# Patient Record
Sex: MALE | Race: WHITE | NOT HISPANIC OR LATINO | Employment: OTHER | URBAN - METROPOLITAN AREA
[De-identification: names, ages, dates, MRNs, and addresses within clinical notes are randomized per-mention and may not be internally consistent; named-entity substitution may affect disease eponyms.]

---

## 2017-11-19 PROBLEM — N18.4 CKD (CHRONIC KIDNEY DISEASE), STAGE IV: Status: ACTIVE | Noted: 2017-11-19

## 2017-11-19 PROBLEM — E11.9 DIABETES: Status: ACTIVE | Noted: 2017-11-19

## 2017-11-19 PROBLEM — I50.9 CONGESTIVE HEART FAILURE: Status: ACTIVE | Noted: 2017-11-19

## 2017-11-19 PROBLEM — I25.10 ATHEROSCLEROSIS OF NATIVE CORONARY ARTERY: Status: ACTIVE | Noted: 2017-11-19

## 2017-12-03 PROBLEM — I25.10 ATHEROSCLEROSIS OF NATIVE CORONARY ARTERY: Status: ACTIVE | Noted: 2017-12-03

## 2017-12-03 PROBLEM — N18.4 CKD (CHRONIC KIDNEY DISEASE), STAGE IV: Status: ACTIVE | Noted: 2017-12-03

## 2017-12-03 PROBLEM — E11.9 DIABETES: Status: ACTIVE | Noted: 2017-12-03

## 2018-02-01 PROBLEM — I25.10 CORONARY ARTERY DISEASE: Status: ACTIVE | Noted: 2018-02-01

## 2018-02-01 PROBLEM — I73.9 PAD (PERIPHERAL ARTERY DISEASE): Status: ACTIVE | Noted: 2018-02-01

## 2018-02-10 PROBLEM — I50.1 PULMONARY EDEMA CARDIAC CAUSE: Status: ACTIVE | Noted: 2018-02-10

## 2018-12-11 ENCOUNTER — TELEPHONE (OUTPATIENT)
Dept: TRANSPLANT | Facility: CLINIC | Age: 63
End: 2018-12-11

## 2018-12-17 DIAGNOSIS — Z76.82 ORGAN TRANSPLANT CANDIDATE: Primary | ICD-10-CM

## 2019-02-21 ENCOUNTER — HOSPITAL ENCOUNTER (OUTPATIENT)
Dept: RADIOLOGY | Facility: HOSPITAL | Age: 64
Discharge: HOME | End: 2019-02-21
Attending: NURSE PRACTITIONER
Payer: MEDICARE

## 2019-02-21 ENCOUNTER — OFFICE VISIT (OUTPATIENT)
Dept: CARDIOLOGY | Facility: CLINIC | Age: 64
End: 2019-02-21
Payer: MEDICARE

## 2019-02-21 ENCOUNTER — TELEPHONE (OUTPATIENT)
Dept: TRANSPLANT | Facility: CLINIC | Age: 64
End: 2019-02-21

## 2019-02-21 ENCOUNTER — OFFICE VISIT (OUTPATIENT)
Dept: TRANSPLANT | Facility: CLINIC | Age: 64
End: 2019-02-21
Payer: MEDICARE

## 2019-02-21 VITALS
WEIGHT: 194.25 LBS | BODY MASS INDEX: 27.81 KG/M2 | SYSTOLIC BLOOD PRESSURE: 125 MMHG | DIASTOLIC BLOOD PRESSURE: 58 MMHG | HEIGHT: 70 IN | HEART RATE: 65 BPM

## 2019-02-21 VITALS
RESPIRATION RATE: 16 BRPM | DIASTOLIC BLOOD PRESSURE: 57 MMHG | HEIGHT: 68 IN | SYSTOLIC BLOOD PRESSURE: 132 MMHG | HEART RATE: 65 BPM | BODY MASS INDEX: 29.01 KG/M2 | TEMPERATURE: 98 F | OXYGEN SATURATION: 97 % | WEIGHT: 191.38 LBS

## 2019-02-21 DIAGNOSIS — Z99.2 CONTROLLED TYPE 2 DIABETES MELLITUS WITH CHRONIC KIDNEY DISEASE ON CHRONIC DIALYSIS, UNSPECIFIED WHETHER LONG TERM INSULIN USE: ICD-10-CM

## 2019-02-21 DIAGNOSIS — N18.6 ANEMIA IN ESRD (END-STAGE RENAL DISEASE): ICD-10-CM

## 2019-02-21 DIAGNOSIS — N18.6 CONTROLLED TYPE 2 DIABETES MELLITUS WITH CHRONIC KIDNEY DISEASE ON CHRONIC DIALYSIS, UNSPECIFIED WHETHER LONG TERM INSULIN USE: ICD-10-CM

## 2019-02-21 DIAGNOSIS — E11.22 CONTROLLED TYPE 2 DIABETES MELLITUS WITH CHRONIC KIDNEY DISEASE ON CHRONIC DIALYSIS, UNSPECIFIED WHETHER LONG TERM INSULIN USE: ICD-10-CM

## 2019-02-21 DIAGNOSIS — Z95.1 S/P CABG X 4: Chronic | ICD-10-CM

## 2019-02-21 DIAGNOSIS — I10 ESSENTIAL HYPERTENSION: Chronic | ICD-10-CM

## 2019-02-21 DIAGNOSIS — I73.9 PAD (PERIPHERAL ARTERY DISEASE): ICD-10-CM

## 2019-02-21 DIAGNOSIS — N18.6 ESRD (END STAGE RENAL DISEASE) ON DIALYSIS: Chronic | ICD-10-CM

## 2019-02-21 DIAGNOSIS — E78.00 HYPERCHOLESTEREMIA: Chronic | ICD-10-CM

## 2019-02-21 DIAGNOSIS — Z99.2 ESRD (END STAGE RENAL DISEASE) ON DIALYSIS: Chronic | ICD-10-CM

## 2019-02-21 DIAGNOSIS — R80.9 PROTEINURIA, UNSPECIFIED TYPE: Chronic | ICD-10-CM

## 2019-02-21 DIAGNOSIS — Z79.01 CURRENT USE OF LONG TERM ANTICOAGULATION: Chronic | ICD-10-CM

## 2019-02-21 DIAGNOSIS — Z01.810 PREOPERATIVE CARDIOVASCULAR EXAMINATION: Primary | ICD-10-CM

## 2019-02-21 DIAGNOSIS — Z01.818 PRE-TRANSPLANT EVALUATION FOR CHRONIC KIDNEY DISEASE: ICD-10-CM

## 2019-02-21 DIAGNOSIS — Z76.82 ORGAN TRANSPLANT CANDIDATE: ICD-10-CM

## 2019-02-21 DIAGNOSIS — D63.1 ANEMIA IN ESRD (END-STAGE RENAL DISEASE): ICD-10-CM

## 2019-02-21 DIAGNOSIS — I25.10 CORONARY ARTERY DISEASE, ANGINA PRESENCE UNSPECIFIED, UNSPECIFIED VESSEL OR LESION TYPE, UNSPECIFIED WHETHER NATIVE OR TRANSPLANTED HEART: Primary | ICD-10-CM

## 2019-02-21 DIAGNOSIS — I25.10 CORONARY ARTERY DISEASE INVOLVING NATIVE CORONARY ARTERY OF NATIVE HEART WITHOUT ANGINA PECTORIS: ICD-10-CM

## 2019-02-21 DIAGNOSIS — E78.49 OTHER HYPERLIPIDEMIA: ICD-10-CM

## 2019-02-21 PROCEDURE — 93978 VASCULAR STUDY: CPT | Mod: TC,TXP

## 2019-02-21 PROCEDURE — 99204 PR OFFICE/OUTPT VISIT, NEW, LEVL IV, 45-59 MIN: ICD-10-PCS | Mod: S$PBB,TXP,, | Performed by: PHYSICIAN ASSISTANT

## 2019-02-21 PROCEDURE — 99205 OFFICE O/P NEW HI 60 MIN: CPT | Mod: S$PBB,TXP,, | Performed by: NURSE PRACTITIONER

## 2019-02-21 PROCEDURE — 71046 XR CHEST PA AND LATERAL: ICD-10-PCS | Mod: 26,TXP,, | Performed by: RADIOLOGY

## 2019-02-21 PROCEDURE — 93010 ELECTROCARDIOGRAM REPORT: CPT | Mod: S$PBB,TXP,, | Performed by: INTERNAL MEDICINE

## 2019-02-21 PROCEDURE — 99204 OFFICE O/P NEW MOD 45 MIN: CPT | Mod: S$PBB,TXP,, | Performed by: INTERNAL MEDICINE

## 2019-02-21 PROCEDURE — 76770 US EXAM ABDO BACK WALL COMP: CPT | Mod: TC,TXP

## 2019-02-21 PROCEDURE — 72170 X-RAY EXAM OF PELVIS: CPT | Mod: TC,FY,TXP

## 2019-02-21 PROCEDURE — 99999 PR PBB SHADOW E&M-EST. PATIENT-LVL IV: ICD-10-PCS | Mod: PBBFAC,TXP,, | Performed by: NURSE PRACTITIONER

## 2019-02-21 PROCEDURE — 93005 ELECTROCARDIOGRAM TRACING: CPT | Mod: PBBFAC,NTX | Performed by: INTERNAL MEDICINE

## 2019-02-21 PROCEDURE — 99204 OFFICE O/P NEW MOD 45 MIN: CPT | Mod: S$PBB,TXP,, | Performed by: PHYSICIAN ASSISTANT

## 2019-02-21 PROCEDURE — 99205 PR OFFICE/OUTPT VISIT, NEW, LEVL V, 60-74 MIN: ICD-10-PCS | Mod: S$PBB,TXP,, | Performed by: NURSE PRACTITIONER

## 2019-02-21 PROCEDURE — 99203 OFFICE O/P NEW LOW 30 MIN: CPT | Mod: S$PBB,TXP,, | Performed by: TRANSPLANT SURGERY

## 2019-02-21 PROCEDURE — 76770 US EXAM ABDO BACK WALL COMP: CPT | Mod: 26,TXP,, | Performed by: RADIOLOGY

## 2019-02-21 PROCEDURE — 99204 PR OFFICE/OUTPT VISIT, NEW, LEVL IV, 45-59 MIN: ICD-10-PCS | Mod: S$PBB,TXP,, | Performed by: INTERNAL MEDICINE

## 2019-02-21 PROCEDURE — 72170 XR PELVIS ROUTINE AP: ICD-10-PCS | Mod: 26,TXP,, | Performed by: RADIOLOGY

## 2019-02-21 PROCEDURE — 99214 OFFICE O/P EST MOD 30 MIN: CPT | Mod: PBBFAC,25,27,TXP | Performed by: NURSE PRACTITIONER

## 2019-02-21 PROCEDURE — 93010 EKG 12-LEAD: ICD-10-PCS | Mod: S$PBB,TXP,, | Performed by: INTERNAL MEDICINE

## 2019-02-21 PROCEDURE — 99999 PR PBB SHADOW E&M-EST. PATIENT-LVL III: ICD-10-PCS | Mod: PBBFAC,TXP,, | Performed by: INTERNAL MEDICINE

## 2019-02-21 PROCEDURE — 93978 US DOPP ILIACS BILATERAL: ICD-10-PCS | Mod: 26,TXP,, | Performed by: RADIOLOGY

## 2019-02-21 PROCEDURE — 93978 VASCULAR STUDY: CPT | Mod: 26,TXP,, | Performed by: RADIOLOGY

## 2019-02-21 PROCEDURE — 71046 X-RAY EXAM CHEST 2 VIEWS: CPT | Mod: TC,FY,TXP

## 2019-02-21 PROCEDURE — 99213 OFFICE O/P EST LOW 20 MIN: CPT | Mod: PBBFAC,TXP,25 | Performed by: INTERNAL MEDICINE

## 2019-02-21 PROCEDURE — 76770 US RETROPERITONEAL COMPLETE: ICD-10-PCS | Mod: 26,TXP,, | Performed by: RADIOLOGY

## 2019-02-21 PROCEDURE — 99999 PR PBB SHADOW E&M-EST. PATIENT-LVL IV: CPT | Mod: PBBFAC,TXP,, | Performed by: NURSE PRACTITIONER

## 2019-02-21 PROCEDURE — 72170 X-RAY EXAM OF PELVIS: CPT | Mod: 26,TXP,, | Performed by: RADIOLOGY

## 2019-02-21 PROCEDURE — 99999 PR PBB SHADOW E&M-EST. PATIENT-LVL III: CPT | Mod: PBBFAC,TXP,, | Performed by: INTERNAL MEDICINE

## 2019-02-21 PROCEDURE — 99203 PR OFFICE/OUTPT VISIT, NEW, LEVL III, 30-44 MIN: ICD-10-PCS | Mod: S$PBB,TXP,, | Performed by: TRANSPLANT SURGERY

## 2019-02-21 PROCEDURE — 71046 X-RAY EXAM CHEST 2 VIEWS: CPT | Mod: 26,TXP,, | Performed by: RADIOLOGY

## 2019-02-21 RX ORDER — INSULIN ASPART 100 [IU]/ML
INJECTION, SOLUTION INTRAVENOUS; SUBCUTANEOUS
COMMUNITY
End: 2020-01-01 | Stop reason: CLARIF

## 2019-02-21 RX ORDER — SEVELAMER CARBONATE 800 MG/1
800 TABLET, FILM COATED ORAL
COMMUNITY

## 2019-02-21 RX ORDER — AMIODARONE HYDROCHLORIDE 200 MG/1
200 TABLET ORAL 2 TIMES DAILY
COMMUNITY
End: 2020-01-01 | Stop reason: CLARIF

## 2019-02-21 RX ORDER — AMLODIPINE BESYLATE 5 MG/1
5 TABLET ORAL DAILY
COMMUNITY
End: 2020-01-01 | Stop reason: CLARIF

## 2019-02-21 RX ORDER — CLOPIDOGREL BISULFATE 75 MG/1
75 TABLET ORAL DAILY
COMMUNITY

## 2019-02-21 NOTE — TELEPHONE ENCOUNTER
Reviewed pt transplant labs.  Notified dialysis unit dietitian of the following abnormal labs via fax.   Ha1c 6.9  Glu 51

## 2019-02-21 NOTE — PROGRESS NOTES
Transplant Surgery  Kidney Transplant Recipient Evaluation    Referring Physician: Asif Meza  Current Nephrologist: Asif Meza    Subjective:     Reason for Visit: evaluate transplant candidacy    History of Present Illness: Asif Cortez is a 63 y.o. year old male undergoing transplant evaluation.    Dialysis History: Asif is on hemodialysis.      Transplant History: N/A    Etiology of Renal Disease: Diabetes Mellitus - Type II (based on medical records from referral).    Review of Systems   Constitutional: Negative.  Negative for activity change and fatigue.   Respiratory: Negative for chest tightness and shortness of breath.    Cardiovascular: Negative for chest pain and leg swelling.   All other systems reviewed and are negative.      Objective:     Physical Exam:  Constitutional:   Vitals reviewed: yes   Well-nourished and well-groomed: yes  Eyes:   Sclerae icteric: no   Extraocular movements intact: yes  GI:    Bowel sounds normal: yes   Tenderness: no    If yes, quadrant/location: not applicable   Palpable masses: no    If yes, quadrant/location: not applicable   Hepatosplenomegaly: no   Ascites: no   Hernia: no    If yes, type/location: not applicable   Surgical scars: yes    If yes, type/location: midline plus bilateral thigh (femoral) incision and left femoropopliteal incision    Resp:   Effort normal: yes   Breath sounds normal: yes    CV:   Regular rate and rhythm: yes   Heart sounds normal: yes   Femoral pulses normal: yes   Extremities edematous: no  Skin:   Rashes or lesions present: no    If yes, describe:not applicable   Jaundice:: no    Musculoskeletal:   Gait normal: yes   Strength normal: yes  Psych:   Oriented to person, place, and time: yes   Affect and mood normal: yes    Additional comments: not applicable    Counseling: We provided Asif Cortez with a group education session today.  We discussed kidney transplantation at length with him, including risks, potential  complications, and alternatives in the management of his renal failure.  The discussion included complications related to anesthesia, bleeding, infection, primary nonfunction, and ATN.  I discussed the typical postoperative course, length of hospitalization, the need for long-term immunosuppression, and the need for long-term routine follow-up.  I discussed living-donor and -donor transplantation and the relative advantages and disadvantages of each.  I also discussed average waiting times for both living donation and  donation.  I discussed national and center-specific survival rates.  I also mentioned the potential benefit of multicenter listing to candidates listed with centers within more than one organ procurement organization.  All questions were answered.    Final determination of transplant candidacy will be made once evaluation is complete and reviewed by the Kidney & Kidney/Pancreas Selection Committee.         Transplant Surgery - Candidacy   Assessment/Plan:   Asif Cortez has end stage renal disease (ESRD) on dialysis. I have concerns with his h/o PVD - New allograft must go in the aorto-femoral graft. Based on available information, Asif Cortez is a high complexity a high-risk kidney transplant candidate. He's on ASA - plavix    Needs abdomen-pelvis CT-scan     Beny Galvan MD

## 2019-02-21 NOTE — PROGRESS NOTES
"Subjective:   Patient ID:  Asif Cortez is a 63 y.o. male who presents for  Kidney Transplant Evaluation      HPI:   The patient  presents for risk stratification for kidney transplantation. Asif Cortez has known coronary artery disease having had CABG X 4 in 2011. 4 months ago the patient the patient had a stress test performed by his  Cardiologist and subsequently had a coronary angiography with medical management recommended. He was having no chest pain and currently has none ( records not available. Asif Cortez has peripheral vascular disease and underwent aortobifemoral 10 months ago . Since then , he has had no further claudication. He is " as active as I can be ". Asif Cortez denies chest shortness of breath, palpitations, presyncope , or syncope. Asif Cortez has hypertension with good control.Asif Cortez has dyslipidemia  on high intensity statin..  Review of Systems   Constitution: Negative for weakness, malaise/fatigue, weight gain and weight loss.   Eyes: Negative for blurred vision.   Cardiovascular: Negative for chest pain, claudication, cyanosis, dyspnea on exertion, irregular heartbeat, leg swelling, near-syncope, orthopnea, palpitations, paroxysmal nocturnal dyspnea and syncope.   Respiratory: Negative for cough, shortness of breath and wheezing.    Musculoskeletal: Positive for back pain. Negative for falls and myalgias.   Gastrointestinal: Positive for constipation. Negative for abdominal pain, heartburn, nausea and vomiting.   Genitourinary: Negative for nocturia.   Neurological: Positive for headaches. Negative for brief paralysis, dizziness, focal weakness, numbness and paresthesias.        Headaches post dialysis   Psychiatric/Behavioral: Negative for altered mental status.       Current Outpatient Medications   Medication Sig    amiodarone (PACERONE) 200 MG Tab Take 200 mg by mouth 2 (two) times daily.    amLODIPine (NORVASC) 5 MG tablet Take 5 mg by mouth once daily.    " "aspirin 325 MG tablet Take 325 mg by mouth once daily.    atorvastatin (LIPITOR) 80 MG tablet Take 80 mg by mouth every evening.    clopidogrel (PLAVIX) 75 mg tablet Take 75 mg by mouth once daily.    hydrocodone-acetaminophen 10-325mg (NORCO)  mg Tab Take 1 tablet by mouth every 8 (eight) hours as needed for Pain.    insulin aspart U-100 (NOVOLOG FLEXPEN U-100 INSULIN) 100 unit/mL InPn pen Inject into the skin 3 (three) times daily with meals. 5 units with breakfast and dinner, 10 units at lunch    insulin detemir (LEVEMIR FLEXTOUCH U-100 INSULN SUBQ) Inject 35 Units into the skin every evening.    multivit-minerals/FA/lycopene (MEN'S DAILY ORAL) Take 1 tablet by mouth once daily.    sevelamer carbonate (RENVELA) 800 mg Tab Take 800 mg by mouth 3 (three) times daily with meals.    tamsulosin (FLOMAX) 0.4 mg Cp24 Take 0.4 mg by mouth every evening.    cloNIDine (CATAPRES) 0.3 MG tablet Take 1 tablet (0.3 mg total) by mouth 3 (three) times daily.     No current facility-administered medications for this visit.      Objective:   Physical Exam   Constitutional: He is oriented to person, place, and time. He appears well-developed. No distress.   BP (!) 125/58 (BP Location: Right arm, Patient Position: Sitting, BP Method: Medium (Automatic))   Pulse 65   Ht 5' 10" (1.778 m)   Wt 88.1 kg (194 lb 3.6 oz)   BMI 27.87 kg/m²    HENT:   Head: Normocephalic.   Right Ear: External ear normal.   Left Ear: External ear normal.   Eyes: EOM are normal. Pupils are equal, round, and reactive to light. No scleral icterus.   Neck: Neck supple. No JVD present. No thyromegaly present.   Cardiovascular: Normal rate, regular rhythm and intact distal pulses. PMI is not displaced. Exam reveals no gallop and no friction rub.   Murmur heard.   Medium-pitched midsystolic murmur is present with a grade of 2/6 at the upper right sternal border, upper left sternal border and lower left sternal border.  Pulses:       Femoral " pulses are 2+ on the right side, and 2+ on the left side.       Dorsalis pedis pulses are 0 on the right side, and 2+ on the left side.        Posterior tibial pulses are 2+ on the right side, and 2+ on the left side.   Patent fistula left UE with transmission of the bruit to the left shoulder and neck   Pulmonary/Chest: Effort normal and breath sounds normal. No respiratory distress. He has no wheezes. He has no rales.   Median sternotomy scar.   Abdominal: Soft. He exhibits no distension. There is no hepatosplenomegaly. There is no tenderness.   Abdominal surgical scar   Musculoskeletal: He exhibits no edema or tenderness.   Gait normal   Neurological: He is alert and oriented to person, place, and time.   Skin: Skin is warm and dry. No rash noted.   Psychiatric: He has a normal mood and affect. His behavior is normal.       Lab Results   Component Value Date     02/21/2019    K 3.6 02/21/2019     02/21/2019    CO2 25 02/21/2019    BUN 60 (H) 02/21/2019    CREATININE 4.2 (H) 02/21/2019    GLU 51 (L) 02/21/2019    HGBA1C 6.9 (H) 02/21/2019    MG 2.2 02/10/2018    AST 24 02/21/2019    ALT 31 02/21/2019    ALBUMIN 3.7 02/21/2019    PROT 7.5 02/21/2019    BILITOT 0.4 02/21/2019    WBC 11.35 02/21/2019    HGB 12.0 (L) 02/21/2019    HCT 36.6 (L) 02/21/2019    HCT 25 (L) 02/10/2018    MCV 95 02/21/2019     02/21/2019    CHOL 134 02/21/2019    HDL 48 02/21/2019    LDLCALC 61.8 (L) 02/21/2019    TRIG 121 02/21/2019       Assessment:     1. Coronary artery disease involving native coronary artery of native heart without angina pectoris : stable   2. Preoperative cardiovascular examination    3. S/P CABG x 4    4. PAD (peripheral artery disease) : No further sxs   5. Essential hypertension : Good control.   6. Hypercholesteremia :  on high intensity statin   7. ESRD (end stage renal disease) on dialysis X 6 months   8. Controlled type 2 diabetes mellitus with chronic kidney disease on chronic dialysis,  unspecified whether long term insulin use        Plan:     Asif was seen today for kidney transplant evaluation.    Diagnoses and all orders for this visit:    Preoperative cardiovascular examination  Have requested records from his Cardiologist. Would suggest obtaining a risk assessment from His  Cardiologist. Revised cardiac risk is > 11% for significant perioperative CV events.  Coronary artery disease involving native coronary artery of native heart without angina pectoris  -     EKG 12-lead; Future    S/P CABG x 4    PAD (peripheral artery disease)    Essential hypertension    Hypercholesteremia    ESRD (end stage renal disease) on dialysis    Controlled type 2 diabetes mellitus with chronic kidney disease on chronic dialysis, unspecified whether long term insulin use

## 2019-02-21 NOTE — PROGRESS NOTES
Transplant Nephrology  Kidney Transplant Recipient Evaluation    Referring Physician: Asif Meza  Current Nephrologist: Asif Meza    Subjective:   CC:  Initial evaluation of kidney transplant candidacy.    HPI:  Mr. Cortez is a 63 y.o. year old White male who has presented to be evaluated as a potential kidney transplant recipient.  He has ESRD secondary to diabetic nephropathy.  Patient is currently on hemodialysis started on 6/22/2018. Patient is dialyzing on MWF schedule.  Patient reports that he is tolerating dialysis well.. He has a LUE AV fistula which is healing and a right chest catheter for dialysis access.     Previous Transplant: no    Past Medical and Surgical History: Mr. Cortez  has a past medical history of Anemia, BPH (benign prostatic hyperplasia), CHF (congestive heart failure), Chronic pain, Coronary artery disease, Coughing, Depression, Diabetes mellitus, type 2, Disorder of kidney and ureter, Dyspnea, Hyperlipidemia, Hypertension, Hypertensive crisis, PAD (peripheral artery disease), Peripheral neuropathy, Pneumonia, Pneumothorax, and URI (upper respiratory infection).  He has a past surgical history that includes Coronary artery bypass graft (2011); Tonsillectomy; Back surgery; Aorta - bilateral femoral artery bypass graft (04/2018); Creation of femoral-femoral artery bypass with graft (Left, 04/2018); Angioplasty; lumbar laminectomy (1982); and Cardiac surgery.    Past Social and Family History: Mr. Cortez reports that he quit smoking about 15 months ago. His smoking use included cigarettes. He smoked 0.00 packs per day for 40.00 years. he has never used smokeless tobacco. He reports that he drinks about 0.6 oz of alcohol per week. He reports that he does not use drugs. His family history includes Diabetes in his brother and father; Heart disease in his father and mother.      DM 2 diagnose at age 32   Insulin    HX CAD:  Angiogram with coronary stents X2  ~ 2003 [facility]   Hospital    S/P CABG x4 7/2/2011  On amiodarone  Cardiologist DR Campoverde  Recently had ABN stress test and underwent angiogram at [facility] 11/2018  Reports medical mgmt no heart stents     PAD--s/p aortic iliac femoral popliteal bypass 4/2018  Dr Khan / cardiothoracic at [facility]. Has been on Plavix since    Chronic back problems, lumbar laminectomy in 1982  At [facility] and a reports ruptured discs.   Is in pain management  Takes Norco daily TID    FX assessment    Does not exercise but stays busy during the day. Will do house and yard work. Denies SOB, chest pain or claudication.   Does not appear frail       Kidney BX --no  Donors--no      Past Medical History:   Diagnosis Date    Anemia     BPH (benign prostatic hyperplasia)     CHF (congestive heart failure)     Chronic pain 1982    back    Coronary artery disease     Coughing 11/19/2017    Depression     Diabetes mellitus, type 2     Disorder of kidney and ureter     Dyspnea 11/19/2017    Hyperlipidemia     Hypertension     Hypertensive crisis 11/19/2017    PAD (peripheral artery disease) 2018    Peripheral neuropathy     Pneumonia 11/19/2017    Pneumothorax     URI (upper respiratory infection)        Review of Systems   Constitutional: Positive for fatigue. Negative for activity change, appetite change, chills, fever and unexpected weight change.   HENT: Positive for voice change. Negative for congestion, facial swelling, postnasal drip, rhinorrhea, sinus pressure, sore throat and trouble swallowing.    Eyes: Negative for pain, redness and visual disturbance.   Respiratory: Negative for cough, chest tightness, shortness of breath and wheezing.    Cardiovascular: Negative.  Negative for chest pain, palpitations and leg swelling.   Gastrointestinal: Negative for abdominal pain, diarrhea, nausea and vomiting.   Genitourinary: Positive for decreased urine volume. Negative for dysuria, flank pain and urgency.   Musculoskeletal: Positive for arthralgias and  "back pain. Negative for gait problem, neck pain and neck stiffness.        Chronic back pain    Skin: Negative for rash.   Allergic/Immunologic: Negative for environmental allergies, food allergies and immunocompromised state.   Neurological: Positive for headaches. Negative for dizziness, weakness and light-headedness.        Peripheral neuropathy     Hematological: Bruises/bleeds easily.        PLavix and ASA   Psychiatric/Behavioral: Negative for agitation and confusion. The patient is not nervous/anxious.        Objective:   Blood pressure (!) 132/57, pulse 65, temperature 97.7 °F (36.5 °C), temperature source Oral, resp. rate 16, height 5' 7.8" (1.722 m), weight 86.8 kg (191 lb 5.8 oz), SpO2 97 %.body mass index is 29.27 kg/m².    Physical Exam   Constitutional: He is oriented to person, place, and time. He appears well-developed and well-nourished.   HENT:   Head: Normocephalic.   Mouth/Throat: Oropharynx is clear and moist. No oropharyngeal exudate.   Eyes: Conjunctivae and EOM are normal. Pupils are equal, round, and reactive to light. No scleral icterus.   Neck: Normal range of motion. Neck supple.   Cardiovascular: Normal rate, regular rhythm and normal heart sounds.   Pulmonary/Chest: Effort normal and breath sounds normal.       Abdominal: Soft. Normal appearance and bowel sounds are normal. He exhibits no distension and no mass. There is no splenomegaly or hepatomegaly. There is no tenderness. There is no rebound, no guarding, no CVA tenderness, no tenderness at McBurney's point and negative Acevedo's sign.   Musculoskeletal: Normal range of motion. He exhibits no edema.   Lymphadenopathy:     He has no cervical adenopathy.   Neurological: He is alert and oriented to person, place, and time. He exhibits normal muscle tone. Coordination normal.   Skin: Skin is warm and dry.   Psychiatric: He has a normal mood and affect. His behavior is normal.   Vitals reviewed.      Labs:  No results found for: " PSA    Lab Results   Component Value Date    WBC 11.35 02/21/2019    HGB 12.0 (L) 02/21/2019    HCT 36.6 (L) 02/21/2019     02/21/2019    K 3.6 02/21/2019     02/21/2019    CO2 25 02/21/2019    BUN 60 (H) 02/21/2019    CREATININE 4.2 (H) 02/21/2019    EGFRNONAA 14.2 (A) 02/21/2019    CALCIUM 9.4 02/21/2019    PHOS 4.3 02/21/2019    MG 2.2 02/10/2018    ALBUMIN 3.7 02/21/2019    AST 24 02/21/2019    ALT 31 02/21/2019    UTPCR 1.51 (H) 05/03/2018    .0 (H) 02/21/2019       Lab Results   Component Value Date    BILIRUBINUA Negative 05/03/2018    PROTEINUA 2+ (A) 05/03/2018    NITRITE Negative 05/03/2018    RBCUA 0 05/03/2018    WBCUA 5 05/03/2018       No results found for: HLAABCTYPE    Labs were reviewed with the patient.    Assessment:     1. Coronary artery disease, angina presence unspecified, unspecified vessel or lesion type, unspecified whether native or transplanted heart    2. Pre-transplant evaluation for chronic kidney disease    3. PAD (peripheral artery disease)    4. Essential hypertension    5. Other hyperlipidemia    6. Anemia in ESRD (end-stage renal disease)    7. S/P CABG x 4    8. Controlled type 2 diabetes mellitus with chronic kidney disease on chronic dialysis, unspecified whether long term insulin use    9. Current use of long term anticoagulation--Plavix    10. Proteinuria, unspecified type        Plan:   Get records:   Angiogram and stress test 11/2018 at [facility]  Aortic iliac fem pop bypass at [facility] 4/218  CAGB x4 2011 at [facility]    Cardiology clearance--follows with Dr Campoverde at [facility]  Vascular clearance-follows with Dr Khan at [facility]      Kidney allocation scheme was also discussed with the patient.  Patient was advised that the average wait time for a kidney in [place] is 3-5 years     Kidney donor profile index (KPDI) and estimated post-transplant survival scores (EPTS) were reviewed. The benefits and risks of accepting a kidney with KDPI > 85% were explained to the patient.  Patient verbalized understanding and consented to accept a kidney with KDPI > 85%       The side effects of immunosuppressants were reviewed that include but are not limited to the risk of infection, the risks of cancer (skin and lymphoma being most common), the risk of diabetes associated with prednisone use, acceleration of heart disease and diarrhea( this being more common post transplant).     Reviewed the hospital stay.  Reviewed the post transplant follow up.  Reviewed the importance of maintaining a good weight.  Reviewed the importance of controlling BP.  Reviewed the importance of following the renal diet.      Transplant Candidacy:   Based on available information, Mr. Cortez is a high-risk kidney transplant candidate d/t HX CAD, PAD and DM 2.    Meets center eligibility for accepting HCV+ donor offer - yes.  Patient educated on HCV+ donors. Asif is willing to accept HCV+ donor offer - yes   Patient is a candidate for KDPI > 85 kidney donor offer - no d/t weight and Plavix.  Final determination of transplant candidacy will be made once workup is complete and reviewed by the selection committee.    Jane Kearney, PACO       OS Patient Status  Functional Status: 60% - Requires occasional assistance but is able to care for needs  Physical Capacity: No Limitations

## 2019-02-21 NOTE — PROGRESS NOTES
PHARM.D. PRE-TRANSPLANT NOTE:    This patient's medication therapy was evaluated as part of his pre-transplant evaluation.    The following pharmacologic concerns were noted:  Aspirin/clopidogral may increase risk of serene-op bleeding; takes amiodarone-- may limit options for treatment if utilizing a HCV positive donor    This patient's medication profile was reviewed for contraindications for DAA Hepatitis C therapy:    [X]  No current inducers of CYP 3A4 or PGP           Current Outpatient Medications   Medication Sig Dispense Refill    amiodarone (PACERONE) 200 MG Tab Take 200 mg by mouth 2 (two) times daily.      amLODIPine (NORVASC) 5 MG tablet Take 5 mg by mouth once daily.      aspirin 325 MG tablet Take 325 mg by mouth once daily.      atorvastatin (LIPITOR) 80 MG tablet Take 80 mg by mouth every evening.      clopidogrel (PLAVIX) 75 mg tablet Take 75 mg by mouth once daily.      hydrocodone-acetaminophen 10-325mg (NORCO)  mg Tab Take 1 tablet by mouth every 8 (eight) hours as needed for Pain.      insulin aspart U-100 (NOVOLOG FLEXPEN U-100 INSULIN) 100 unit/mL InPn pen Inject into the skin 3 (three) times daily with meals. 5 units with breakfast and dinner, 10 units at lunch      insulin detemir (LEVEMIR FLEXTOUCH U-100 INSULN SUBQ) Inject 35 Units into the skin every evening.      multivit-minerals/FA/lycopene (MEN'S DAILY ORAL) Take 1 tablet by mouth once daily.      sevelamer carbonate (RENVELA) 800 mg Tab Take 800 mg by mouth 3 (three) times daily with meals.      tamsulosin (FLOMAX) 0.4 mg Cp24 Take 0.4 mg by mouth every evening.      cloNIDine (CATAPRES) 0.3 MG tablet Take 1 tablet (0.3 mg total) by mouth 3 (three) times daily. 90 tablet 11     No current facility-administered medications for this visit.          Currently Mr Cortez  is responsible for preparing / administering his own medications on a daily basis.  I am available for consultation and can be contacted, as needed by the  other members of the Kidney Transplant team.

## 2019-02-21 NOTE — LETTER
February 22, 2019        Asif Meza  [address]  [address]  [place] [address]  Phone: [phone]  Fax: [phone]             [facility]  [address]  [facility] [address]  Phone: [phone]   Patient: Asif Cortez   MR Number: [id number]   YOB: 1955   Date of Visit: 2/21/2019       Dear Dr. Asif Meza    Thank you for referring Asif Cortez to me for evaluation. Attached you will find relevant portions of my assessment and plan of care.    If you have questions, please do not hesitate to call me. I look forward to following Asif Cortez along with you.    Sincerely,    Jane Kearney, NP    Enclosure    If you would like to receive this communication electronically, please contact [email or web address] or [phone] to request [employer] access.    [employer] Link is a tool which provides read-only access to select patient information with whom you have a relationship. Its easy to use and provides real time access to review your patients record including encounter summaries, notes, results, and demographic information.    If you feel you have received this communication in error or would no longer like to receive these types of communications, please e-mail [email or web address]

## 2019-02-21 NOTE — PROGRESS NOTES
Pre Transplant Infectious Diseases Consult  Kidney Transplant Recipient Evaluation    Requesting Physician:     Reason for Visit:    Chief Complaint   Patient presents with    Kidney Transplant Pre-evaluation     History of Present Illness  Asif Cortez is a 63 y.o. year old White male with advanced Kidney disease currently being evaluated for Kidney transplant. Etiology of kidney failure is HTN and DM. Pt is on HD. Has temporary chest port and has LUE AVF.  Patient denies any recent fever, chills, or infective illnesses.    Completed abx for AVF x 5 days.     1) Do you have a history of:   YES NO   Diabetes      [x] []      Diabetic Foot Infection/Bone Infection  []        [x]     Surgical Removal of Spleen   []  [x]     2) Have you had recurrent infections involving:             YES NO  Sinus infections  []         [x]   Sore Throat   []         [x]                 Prostate Infections  []         [x]              Bladder Infections  []         [x]                     Kidney Infections  []         [x]                               Intestinal Infections  []         [x]      Skin Infections   []         [x]       Reproductive Infections          []  [x]   Periodontal Disease  []         [x]        3)Have you ever had: YES     NO UNKNOWN      Chicken Pox   []         [x]  []   Shingles   []         [x]  []   Orolabial Herpes             []  [x]  []   Genital Herpes  []         [x]  []   Cytomegalovirus  []         [x]  []   Sylvain-Barr Virus  []         [x]  []   Genital Warts   []         [x]  []   Hepatitis A   []         [x]  []   Hepatitis B   []         [x]  []   Hepatitis C   []         [x]  []   Syphilis   []         [x]  []   Gonorrhea   []         [x]  []   Pelvic Inflammatory   Disease   []         [x]  []   Chlamydia Infection  []         [x]  []   Intestinal parasites   or worms   []         [x]  []   Fungal Infections  []         [x]  []   Blood Infections  []         [x]  []       4) Have you ever been  exposed   YES NO  To someone with tuberculosis?  []   [x]   If yes, what treatment did you receive:     5) What states have you lived in? [place], [place] ([place], [place]     6) What countries have you visited for more than 2 weeks? none                [place] after your transplant?    9) Household                   YES NO  Do you have pets living in your house?    []         [x]   If yes, describe:     Do you spend time or live on a farm or     []         [x]   have livestock or other farm animals?  If yes, which ones:    Do you have a fish tank?          []  [x]       Do you have a litter box?      []         [x]     Do you fish or hunt?       [x]         []     Do you clean or skin fish or animals?    [x]         []     Do you consume raw or undercooked    [x]         []   meat, fish, or shellfish?      10) What occupations have you had?            12)Do you garden or otherwise  YES NO   work in the soil?    []         [x]   13)Do you hike, camp, or spend     time in wooded areas?   []         [x]        14) The patient's immunization history was reviewed.    Have you ever received:  YES NO UNKNOWN DATES   Routine Childhood vaccines  [x]         []  []      Influenza vaccine   [x]  []  []    Pneumovax    [x]  []  []     Tetanus-diptheria   []         [x]  []    Hepatitis A vaccine series       []  [x]  []    Hepatitis B vaccine series         []  [x]  []    Meningitis vaccine   []         [x]  []    Varicella vaccine   []         [x]  []        Based on the patients immunization history and serologies, immunizations were ordered:         Ordered  Not Ordered  Influenza Vaccine     []    [x]   Hepatitis A series at 0,  6 months   []    [x]   Hepatitis B seriesat 0, 1, and 6 months  []    [x]   Hepatitis B High Dose 0,1, and 6 months  []    [x]   Prevnar      []    [x]   Pneumovax      []    [x]    TDap       []    [x]    Zoster       []    [x]   Menactra      []    [x]            The patient was encouraged to  contact us about any problems that may develop after immunization and possible side effects were reviewed.      Previous Transplant: no    Etiology of Kidney Disease: DM, HTN    Allergies: Patient has no known allergies.  Immunization History   Administered Date(s) Administered    Influenza - Quadrivalent - PF 2017    Pneumococcal Conjugate - 13 Valent 2017     Past Medical History:   Diagnosis Date    BPH (benign prostatic hyperplasia)     CHF (congestive heart failure)     Chronic pain 1982    back    Coronary artery disease     Coughing 2017    Depression     Diabetes mellitus, type 2     Dyspnea 2017    Hyperlipidemia     Hypertension     Hypertensive crisis 2017    PAD (peripheral artery disease) 2018    Peripheral neuropathy     Pneumonia 2017    URI (upper respiratory infection)      Past Surgical History:   Procedure Laterality Date    AORTA - BILATERAL FEMORAL ARTERY BYPASS GRAFT  2018    Open     BACK SURGERY      no hardware    CORONARY ARTERY BYPASS GRAFT      CREATION OF FEMORAL-FEMORAL ARTERY BYPASS WITH GRAFT Left 2018    Open     TONSILLECTOMY        Social History     Socioeconomic History    Marital status:      Spouse name: Not on file    Number of children: Not on file    Years of education: Not on file    Highest education level: Not on file   Social Needs    Financial resource strain: Not on file    Food insecurity - worry: Not on file    Food insecurity - inability: Not on file    Transportation needs - medical: Not on file    Transportation needs - non-medical: Not on file   Occupational History    Occupation:     Tobacco Use    Smoking status: Former Smoker     Packs/day: 0.00     Years: 40.00     Pack years: 0.00     Types: Cigarettes     Last attempt to quit: 2017     Years since quittin.3    Smokeless tobacco: Never Used   Substance and Sexual Activity    Alcohol use: Yes     Alcohol/week:  0.6 oz     Types: 1 Cans of beer per week     Comment: rarely    Drug use: No    Sexual activity: Yes     Partners: Female     Birth control/protection: None   Other Topics Concern    Not on file   Social History Narrative    Not on file       Review of Systems   Constitution: Negative for chills, decreased appetite, fever, weakness, malaise/fatigue, night sweats, weight gain and weight loss.   HENT: Negative for congestion, ear pain, hearing loss, hoarse voice, sore throat and tinnitus.    Eyes: Negative for blurred vision, pain, vision loss in left eye, vision loss in right eye and visual disturbance.   Cardiovascular: Negative for chest pain, dyspnea on exertion, leg swelling and palpitations.   Respiratory: Negative for cough, shortness of breath, sputum production and wheezing.    Skin: Negative for dry skin, itching, rash and suspicious lesions.   Musculoskeletal: Negative for back pain, joint pain, myalgias and neck pain.   Gastrointestinal: Negative for abdominal pain, constipation, diarrhea, heartburn, nausea and vomiting.   Genitourinary: Negative for dysuria, flank pain, frequency, hematuria, hesitancy and urgency.   Neurological: Negative for dizziness, headaches, numbness and paresthesias.   Psychiatric/Behavioral: Negative for depression and memory loss. The patient does not have insomnia and is not nervous/anxious.      Physical Exam   Constitutional: He is oriented to person, place, and time. He appears well-developed and well-nourished. No distress.   HENT:   Head: Normocephalic and atraumatic.   Mouth/Throat: Oropharynx is clear and moist.   Eyes: EOM are normal. Pupils are equal, round, and reactive to light.   Neck: Normal range of motion. Neck supple.   Cardiovascular: Normal rate, regular rhythm, normal heart sounds and intact distal pulses. Exam reveals no gallop and no friction rub.   No murmur heard.  Temporary chest port    Pulmonary/Chest: Effort normal and breath sounds normal. No  respiratory distress. He has no wheezes. He has no rales. He exhibits no tenderness.   Abdominal: Bowel sounds are normal. He exhibits no distension and no mass. There is no tenderness. There is no guarding.   Musculoskeletal: Normal range of motion. He exhibits no edema or deformity.   LUE AVF   Neurological: He is alert and oriented to person, place, and time.   Skin: Skin is warm and dry. No rash noted. He is not diaphoretic. No erythema.   Psychiatric: He has a normal mood and affect.   Nursing note and vitals reviewed.    Diagnostics: No results found for: RPR  No results found for: CMVANTIBODIE  No results found for: HIV1X2  No results found for: HTLVIIIANTIB  No results found for: HEPBSAG  No results found for: HEPBCAB  No results found for: HEPCAB  No results found for: TOXOIGG  No components found for: TOXOIGGINTER  No results found for: MBT6LWM  No results found for: SBU6YXT  No results found for: VARICELLAZOS  No results found for: VARICELLAINT  No results found for: STRONGANTIGG  No results found for: EPSTEINBARRV  No results found for: HEPBSAB  No results found for: QUANTIFERON  No results found for: HEPAIGM  No results found for: PPD  No results found for this or any previous visit.         Transplant Infectious Diseases - Candidacy    Assessment/Plan:     Transplant Candidacy: Based on available information, there are no identified significant barriers to transplantation from an infectious disease standpoint pending acceptable serologies.  Final determination of transplant candidacy will be made once evaluation is complete and reviewed by the Transplant Selection Committee.    Quantiferon gold, HIV, Strongyloides, and RPR pending. If positive, please refer to ID clinic.      Rx given for:  Hepatitis A vaccine today, 6 months  tdap vaccine    Burke Moura PA-C         Counseling:I discussed with Asif the risk for increased susceptibility to infections following transplantation including increased  risk for infection right after transplant and if rejection should occur.  The patients has been counseled on the importance of vaccinations including but not limited to a yearly flu vaccine.  Specific guidance has been provided to the patient regarding the patients occupation, hobbies and activities to avoid future infectious complications including but not limited to avoiding undercooked meats and seafood, proper hygiene, and contact with animals.

## 2019-02-21 NOTE — LETTER
February 21, 2019      Devon Daniel MD  [address]  [facility] [address]           [facility]  [address]  [facility] [address]  Phone: [phone]          Patient: Asif Cortez   MR Number: [id number]   YOB: 1955   Date of Visit: 2/21/2019       Dear Dr. Devon Daniel:    Thank you for referring Asif Cortez to me for evaluation. Attached you will find relevant portions of my assessment and plan of care.    If you have questions, please do not hesitate to call me. I look forward to following Asif Cortez along with you.    Sincerely,    Terell Solares MD    Enclosure  CC:  No Recipients    If you would like to receive this communication electronically, please contact [email or web address] or [phone] to request more information on [facility] access.    For providers and/or their staff who would like to refer a patient to [facility], please contact us through our one-stop-shop provider referral line, Juan Tuttle, at [phone].    If you feel you have received this communication in error or would no longer like to receive these types of communications, please e-mail [email or web address]

## 2019-02-22 NOTE — PROGRESS NOTES
INITIAL PATIENT EDUCATION NOTE    Mr. Asif Cortez was seen in pre-kidney transplant clinic for evaluation for kidney, kidney/pancreas or pancreas only transplant.  The patient attended a group education session that discussed/reviewed the following aspects of transplantation: evaluation and selection committee process, [facility]OS waitlist management/multiple listings, types of organs offered (KDPI < 85%, KDPI > 85%, PHS increased risk, DCD, HCV+), financial aspects, surgical procedures, dietary instruction pre- and post-transplant, health maintenance pre- and post-transplant, post-transplant hospitalization and outpatient follow-up, potential to participate in a research protocol, and medication management and side effects.  A question and answer session was provided after the presentation.    The patient was seen by all members of the multi-disciplinary team to include: Nephrologist/PA, Surgeon, , Transplant Coordinator, , Pharmacist and Dietician (if applicable).    The patient reviewed and signed all consents for evaluation which were witnessed and sent to scanning into the EPIC chart.    The patient was given an education book and plan for further evaluation based on his individual assessment.      The patient was encouraged to call with any questions or concerns.

## 2019-03-08 NOTE — PROGRESS NOTES
Transplant Recipient Adult Psychosocial Assessment    Asif Cortez  [address] [place] [address]  Telephone Information:   Mobile [phone]   Home  [phone] (home)  Work  There is no work phone number on file.  E-mail  [email or web address]    Sex: male  YOB: 1955  Age: 63 y.o.    Encounter Date: 2019  [place] Citizen: yes  Primary Language: English   Needed: no    Emergency Contact:  Name: Varsha Cortez  Relationship: wife  Address: Same as pt.  Phone Numbers:  [phone] (work), [phone] (mobile)    Family/Social Support:   Number of dependents/: Pt reports no dependents.  Marital history: Pt reports 1 marriage of 43 years to Varsha Cortez.  Other family dynamics: Pt reports 2 adult children: Valarie and Margarita and 3 brothers: Nura, CABRERA, and Tien. Pt identifies all family members as supportive. Pt reports both parents are .    Household Composition:  Name: Asif Cortez  Age: 63  Relationship: patient  Does person drive? yes    Name: Varsha Cortez  Age: 62  Relationship: wife  Does person drive? yes    Do you and your caregivers have access to reliable transportation? yes  PRIMARY CAREGIVER: Varsha Cortez (wife) will be primary caregiver, phone number [phone].      provided in-depth information to patient and caregiver regarding pre- and post-transplant caregiver role.   strongly encourages patient and caregiver to have concrete plan regarding post-transplant care giving, including back-up caregiver(s) to ensure care giving needs are met as needed.    Patient and Caregiver states understanding all aspects of caregiver role/commitment and is able/willing/committed to being caregiver to the fullest extent necessary.    Patient and Caregiver verbalizes understanding of the education provided today and caregiver responsibilities.         remains available. Patient and Caregiver agree to contact  in a timely manner if  concerns arise.      Able to take time off work without financial concerns: yes.     Additional Significant Others who will Assist with Transplant:  Name: Valarie Treviño  Age: 36  Phone: [phone]  City: [place] State: [place]  Relationship: daughter  Does person drive? yes    Living Will: no  Healthcare Power of : no  Advance Directives on file: <<no information> per medical record.  Verbally reviewed LW/HCPA information.   provided patient with copy of LW/HCPA documents and provided education on completion of forms.    Living Donors: No. Education and resource information given to patient.    Highest Education Level: Grade School (0-8) (8th Grade)  Reading Ability: 6th grade  Reports difficulty with: reading, hearing and some issues with spelling. Pt reports that his hearing in his right ear is worse than his left ear and reports that wife assists with reading and comprehension  Learns Best By:  a combination of verbal and tactile instructions.     Status: no  VA Benefits: no     Working for Income: No  If no, reason not working: Disability  Patient is disabled.  Prior to disability, patient  was employed as a  for [employer]..    Spouse/Significant Other Employment: Pt's wife reports working full-time as a  for [employer]    Disabled: yes: date disability began: 2017, due to: back issues.    Monthly Income:   Disability: $1650  Pension: $684  Other household members total: $3400     Able to afford all costs now and if transplanted, including medications: yes  Patient and Caregiver verbalizes understanding of personal responsibilities related to transplant costs and the importance of having a financial plan to ensure that patients transplant costs are fully covered.       provided fundraising information/education. Patient and Caregiververbalizes understanding.   remains available.    Insurance:   Payor/Plan Subscr  Sex  Relation Sub. Ins. ID Effective Group Num   1. CIGNA - CIGNA* LANRE QUACH* 1955 Male  [id number] 12/21/15 [id number]                                   [address]   2. MEDICARE - [place]* LANRE QUACH D 1955 Male  [id number] 9/1/18                                    [address]     Primary Insurance (for UNOS reporting): Public Insurance - Medicare FFS (Fee For Service)  Secondary Insurance (for UNOS reporting): Private Insurance Pt reports supplement is being paid for by the [employer]. SW provided education and information regarding this policy post transplant. Patient and Caregiver verbalized understanding and agreement.     Patient and Caregiver verbalizes clear understanding that patient may experience difficulty obtaining and/or be denied insurance coverage post-surgery. This includes and is not limited to disability insurance, life insurance, health insurance, burial insurance, long term care insurance, and other insurances.      Patient and Caregiver also reports understanding that future health concerns related to or unrelated to transplantation may not be covered by patient's insurance.  Resources and information provided and reviewed.     Patient and Caregiver provides verbal permission to release any necessary information to outside resources for patient care and discharge planning.  Resources and information provided are reviewed.      Dialysis Adherence: Patient reports being on hemodialysis in center, attending all dialysis appointments, and staying for the entire course of treatment. Marcela MARROQUIN at 's dialysis center reports over last three months that the patient has had no AMAs, not had any no-shows, and no issues with caregivers, transportation, or any other psychosocial concerns..    Infusion Service: patient utilizing? no  Home Health: patient utilizing? no but pt reports using in the past after surgery in 2018  DME: yes Pt reports having a cane, walker, BSC, BP Cuff,a nd  glucometer  Pulmonary/Cardiac Rehab: Pt denies   ADLS:  Pt reports no difficulties with driving, walking, bathing, cooking, housekeeping, eating, shopping, and taking medication.    Adherence:   Pt reports suitable adherence with medications, dialysis, and health regimen.  Adherence education and counseling provided.     Per History Section:  Past Medical History:   Diagnosis Date    Anemia     BPH (benign prostatic hyperplasia)     CHF (congestive heart failure)     Chronic pain 1982    back    Coronary artery disease     Coughing 2017    Depression     Diabetes mellitus, type 2     Disorder of kidney and ureter     Dyspnea 2017    Hyperlipidemia     Hypertension     Hypertensive crisis 2017    PAD (peripheral artery disease) 2018    Peripheral neuropathy     Peripheral neuropathy     Pneumonia 2017    Pneumothorax     URI (upper respiratory infection)      Social History     Tobacco Use    Smoking status: Former Smoker     Packs/day: 0.00     Years: 40.00     Pack years: 0.00     Types: Cigarettes     Last attempt to quit: 2017     Years since quittin.3    Smokeless tobacco: Never Used   Substance Use Topics    Alcohol use: Yes     Alcohol/week: 0.6 oz     Types: 1 Cans of beer per week     Comment: rarely     Social History     Substance and Sexual Activity   Drug Use No     Social History     Substance and Sexual Activity   Sexual Activity Yes    Partners: Female    Birth control/protection: None       Per Today's Psychosocial:  Tobacco: Pt reports quitting all use in 2017 and reports smoking 4ppd for about 35-40 years. Pt reports that he started smoking at age 9.  Alcohol: Pt reports quitting all use in .  Illicit Drugs/Non-prescribed Medications: Pt reports a remote history of marijuana use in the early .    Patient and Caregiver states clear understanding of the potential impact of substance use as it relates to transplant candidacy and is aware of  possible random substance screening.  Substance abstinence/cessation counseling, education and resources provided and reviewed.     Arrests: Pt reports 1 arrest 50 years ago. Pt reports no pending/outstanding legal issues.  DWI: Pt denies  Treatment/Rehab: Pt reports attending drug rehab for marijuana use in     Psychiatric History:    Mental Health: depression. Pt reports a history of depression when his mother and brother  by suicide. Pt also reports possible depression due to medical condition.  Psychiatrist/Counselor: Pt denies seeing a mental health professional and reports being open to seeing the psych department for talk therapy if necessary.  Medications:  Pt denies taking medications for mental health reasons, however reports that he is supposed to be talking with his nephrologist about possibly being placed on medications.  Suicide/Homicide Issues: Pt denies any history of or current suicidal or homicidal ideations.    Safety at home: Pt reports no current or history of safety concerns in household; including mental, physical, verbal, or sexual abuse.    Knowledge: Patient and Caregiver states having clear understanding and realistic expectations regarding the potential risks and potential benefits of organ transplantation and organ donation and agrees to discuss with health care team members and support system members, as well as to utilize available resources and express questions and/or concerns in order to further facilitate the pt informed decision-making.  Resources and information provided and reviewed.    Patient and Caregiver is aware of [facility]'s affiliation and/or partnership with agencies in home health care, LTAC, SNF, [facility], and other hospitals and clinics.    Understanding: Patient and Caregiver reports having a clear understanding of the many lifetime commitments involved with being a transplant recipient, including costs, compliance, medications, lab work, procedures, appointments,  concrete and financial planning, preparedness, timely and appropriate communication of concerns, abstinence (ETOH, tobacco, illicit non-prescribed drugs), adherence to all health care team recommendations, support system and caregiver involvement, appropriate and timely resource utilization and follow-through, mental health counseling as needed/recommended, and patient and caregiver responsibilities.  Social Service Handbook, resources and detailed educational information provided and reviewed.  Educational information provided.    Patient and Caregiver also reports current and expected compliance with health care regime and states having a clear understanding of the importance of compliance.      Patient and Caregiver reports a clear understanding that risks and benefits may be involved with organ transplantation and with organ donation.       Patient and Caregiver also reports clear understanding that psychosocial risk factors may affect patient, and include but are not limited to feelings of depression, generalized anxiety, anxiety regarding dependence on others, post traumatic stress disorder, feelings of guilt and other emotional and/or mental concerns, and/or exacerbation of existing mental health concerns.  Detailed resources provided and discussed.      Patient and Caregiver agrees to access appropriate resources in a timely manner as needed and/or as recommended, and to communicate concerns appropriately.  Patient and Caregiver also reports a clear understanding of treatment options available.     Patient and Caregiver received education in a group setting.   reviewed education, provided additional information, and answered questions.    Feelings or Concerns: Patient and Caregiver did not express any concerns at this time. Patient reports high motivation to pursue transplant at this time.    Coping: Pt reports coping well with the transplant process at this time and reports going to a local  "donut shop to hang out with his friends, and spending time with his grandchildren as ways to cope.    Goals: Pt reports live comfortably, going fishing, and "doing more things I like to do" as goals for post transplant.  Patient referred to Vocational Rehabilitation.    Interview Behavior: Patient and Caregiver presents as alert and oriented x 4, pleasant, good eye contact, well groomed, recall good, concentration/judgement good, average intelligence, calm, communicative, cooperative and asking and answering questions appropriately. Pt presents with Varsha Cortez, pt's wife at pt's request.         Transplant Social Work - Candidacy  Assessment/Plan:     Psychosocial Suitability: Patient presents as a suitable candidate for kidney transplant at this time. Based on psychosocial risk factors, patient presents as low risk, due to suitable caregiver plan in place, adequate dialysis adherence, suitable coping skills, and financial plan in place.    Recommendations/Additional Comments: SW recommends that pt conduct fundraising to assist pt with pay for cost of medications, food, gas, and other transplant related needs. SW recommends that pt remain aware of potential mental health concerns and contact the team if any concerns arise. SW recommends that pt remain abstinent from tobacco, ETOH, and drug use. SW supports pt's continued adherence. SW remains available to answer any questions or concerns that arise as the pt moves through the transplant process.     Blanca Joseph LCSW         "

## 2019-03-26 DIAGNOSIS — Z76.82 ORGAN TRANSPLANT CANDIDATE: Primary | ICD-10-CM

## 2019-04-22 ENCOUNTER — TELEPHONE (OUTPATIENT)
Dept: TRANSPLANT | Facility: CLINIC | Age: 64
End: 2019-04-22

## 2019-04-24 ENCOUNTER — TELEPHONE (OUTPATIENT)
Dept: TRANSPLANT | Facility: CLINIC | Age: 64
End: 2019-04-24

## 2019-04-24 NOTE — TELEPHONE ENCOUNTER
----- Message from Diane Sierra sent at 4/24/2019 12:07 PM CDT -----  Pt calling to inform coordinator that he is working on getting stress test results sent to her from [facility]    Pt contact [phone]

## 2019-04-24 NOTE — TELEPHONE ENCOUNTER
----- Message from Oscar Waldron sent at 4/24/2019 11:45 AM CDT -----  Contact: Patient       ----- Message -----  From: Aide Sandy  Sent: 4/24/2019  11:42 AM  To: Oscar Waldron        ----- Message -----  From: Genaro Butcher RN  Sent: 4/24/2019   9:53 AM  To: Aide Sandy        ----- Message -----  From: Lidia Jiménez  Sent: 4/23/2019  12:47 PM  To: [facility] Pre-Kidney Transplant Clinical    Needs Advice    Reason for call: Calling to get results        Communication Preference: [phone]     Additional Information: N/A

## 2019-04-24 NOTE — TELEPHONE ENCOUNTER
Call returned. Asif inquiring if we got his results from cardiology, we did not. He states he will call the office. I also reminded him he needs a colonoscopy and he stated he didn't know even though he was told in [facility]. He believes he had one done 5 years ago and it might be time to have it again so I will request the record to confirm.

## 2019-04-25 ENCOUNTER — TELEPHONE (OUTPATIENT)
Dept: TRANSPLANT | Facility: CLINIC | Age: 64
End: 2019-04-25

## 2019-04-25 DIAGNOSIS — Z99.2 ESRD ON HEMODIALYSIS: ICD-10-CM

## 2019-04-25 DIAGNOSIS — Z01.818 PRE-TRANSPLANT EVALUATION FOR CHRONIC KIDNEY DISEASE: Primary | ICD-10-CM

## 2019-04-25 DIAGNOSIS — Z76.82 ORGAN TRANSPLANT CANDIDATE: ICD-10-CM

## 2019-04-25 DIAGNOSIS — N18.6 ESRD ON HEMODIALYSIS: ICD-10-CM

## 2019-04-25 NOTE — TELEPHONE ENCOUNTER
----- Message from Aide Sandy sent at 4/24/2019  3:13 PM CDT -----  Place colonoscopy orders for [facility] please.    Aide

## 2019-04-26 ENCOUNTER — TELEPHONE (OUTPATIENT)
Dept: SURGERY | Facility: CLINIC | Age: 64
End: 2019-04-26

## 2019-04-26 ENCOUNTER — TELEPHONE (OUTPATIENT)
Dept: TRANSPLANT | Facility: CLINIC | Age: 64
End: 2019-04-26

## 2019-04-26 DIAGNOSIS — Z12.11 SCREENING FOR COLON CANCER: Primary | ICD-10-CM

## 2019-04-26 RX ORDER — SODIUM CHLORIDE 0.9 % (FLUSH) 0.9 %
3 SYRINGE (ML) INJECTION
Status: CANCELLED | OUTPATIENT
Start: 2019-04-26

## 2019-04-26 NOTE — TELEPHONE ENCOUNTER
Called patient in reference to screening for colon cancer. The patient verbally consented to a Colonoscopy and requested a Colonoscopy date of 07/16/2019. Patient was advised a designated  is required on the day of the Colonoscopy, and the  must be at least 18 years old. Detailed Colonoscopy Prep instructions were explained and discussed with the patient. The patient was advised the same Colonoscopy Prep instructions explained and discussed on the phone, are being mailed out to the patient's address on file. Address on file was verified with the patient for accuracy of mailing. The patient's current medication profile was reviewed with the patient for accuracy. Aspirin ( ECOTRIN) 81 MG EC tablet and ( PLAVIX ) 75 mg tablets are noted to be listed on the patient's daily medication profile. Patient verified the listed medication profile is accurate. It was explained to patient, a Medication Clearance Request, and Cardiac Clearance is needed before the Colonoscopy can be done. The patient further advised,I am currently on Dialysis with fluid restrictions and cannot drink the required amount of liquids  For the Colonoscopy. Will follow up with the patient on the fluid restrictions.  Patient was advised the Pre-Op nurse will be in contact at least three days prior to the Colonoscopy to give Colonoscopy Pre-Op instructions. The patient was given the opportunity to ask any questions about the Colonoscopy. Patient acknowledges understanding of al instructions. No further issues were discussed.

## 2019-04-26 NOTE — TELEPHONE ENCOUNTER
----- Message from Anastasiia Lambert, CST sent at 4/25/2019  7:08 PM CDT -----  Regarding: Schedule  Mina this  Case request was put in my depot but has no date and a non [facility] doctor was chosen. I reached out to the  and she said she does not have a preference for surgeon and the date is when we have space. Can you take on this patient and get him scheduled? Thanks

## 2019-04-26 NOTE — TELEPHONE ENCOUNTER
----- Message from Dm Lopez sent at 4/26/2019  2:40 PM CDT -----  Contact: patient  Please call pt at [phone] today (very upset about no response from staff)    Patient has questions regarding future kidney transplant protocol  Patient has been unsuccessful in reaching [facility] regarding outside test results   Patient is refusing to having any additional test that has been done already    Thank you

## 2019-04-26 NOTE — TELEPHONE ENCOUNTER
I have spoken with Mr Cortez and he did not seem upset in the least. We reviewed his cardiac workup and I advised that I am unable to ready the doctors writing and will need to call the office to get clarification. I asked about the colonoscopy and he states he is unable to get scheduled in [facility] until mid July and was told he would have to take pills, drink 2 8 oz bottles of prep in addition to several bottles of water and he does not feel like he will be able to do that. We spoe of the prep for colonoscopy and I advised that if he is willing to travel here he can get done much quicker. He states he was told that someone has to come with him and take him home and he does not have anyone and that has never been the case before. I advised because of conscious sedation he must have a  and inquired who will drive him for transplant. He stated his wife or his brother and I encouraged him to see if they could drive him for the colonoscopy and he is to get back with me.

## 2019-05-01 ENCOUNTER — TELEPHONE (OUTPATIENT)
Dept: ENDOSCOPY | Facility: HOSPITAL | Age: 64
End: 2019-05-01

## 2019-05-01 NOTE — TELEPHONE ENCOUNTER
Can Pt hold  Plavix x 5 days prior to colonoscopy  per endoscopy protocol?   Please advise.     Thank you

## 2019-05-02 ENCOUNTER — TELEPHONE (OUTPATIENT)
Dept: TRANSPLANT | Facility: CLINIC | Age: 64
End: 2019-05-02

## 2019-05-02 ENCOUNTER — TELEPHONE (OUTPATIENT)
Dept: ENDOSCOPY | Facility: HOSPITAL | Age: 64
End: 2019-05-02

## 2019-05-02 DIAGNOSIS — N18.6 ESRD (END STAGE RENAL DISEASE) ON DIALYSIS: Primary | Chronic | ICD-10-CM

## 2019-05-02 DIAGNOSIS — Z76.82 ORGAN TRANSPLANT CANDIDATE: ICD-10-CM

## 2019-05-02 DIAGNOSIS — Z79.01 CURRENT USE OF LONG TERM ANTICOAGULATION: Chronic | ICD-10-CM

## 2019-05-02 DIAGNOSIS — Z99.2 ESRD (END STAGE RENAL DISEASE) ON DIALYSIS: Primary | Chronic | ICD-10-CM

## 2019-05-02 NOTE — TELEPHONE ENCOUNTER
MD Roopa Looney, RN   Caller: Unspecified (Yesterday,  9:20 AM)             Ok to hold but would continue 81 mg aspirin daily.    Previous Messages            Patient Calls      Terell Solares MD  You 13 hours ago (8:28 PM)      Ok to hold but would continue 81 mg aspirin daily.    Routing comment        You routed conversation to Terell Solares MD Yesterday (9:22 AM)      You Yesterday (9:22 AM)         Can Pt hold  Plavix x 5 days prior to colonoscopy  per endoscopy protocol?   Please advise.     Thank you            Documentation

## 2019-05-02 NOTE — TELEPHONE ENCOUNTER
----- Message from Aide Sandy sent at 5/2/2019  9:25 AM CDT -----  Please place order for colonoscopy main campus.    Aide

## 2019-05-04 ENCOUNTER — TELEPHONE (OUTPATIENT)
Dept: ENDOSCOPY | Facility: HOSPITAL | Age: 64
End: 2019-05-04

## 2019-05-04 DIAGNOSIS — Z99.2 DIALYSIS PATIENT: ICD-10-CM

## 2019-05-04 DIAGNOSIS — Z12.11 SPECIAL SCREENING FOR MALIGNANT NEOPLASMS, COLON: Primary | ICD-10-CM

## 2019-05-04 RX ORDER — POLYETHYLENE GLYCOL 3350, SODIUM SULFATE ANHYDROUS, SODIUM BICARBONATE, SODIUM CHLORIDE, POTASSIUM CHLORIDE 236; 22.74; 6.74; 5.86; 2.97 G/4L; G/4L; G/4L; G/4L; G/4L
4 POWDER, FOR SOLUTION ORAL ONCE
Qty: 4000 ML | Refills: 0 | Status: SHIPPED | OUTPATIENT
Start: 2019-05-04 | End: 2019-05-04

## 2019-05-04 NOTE — TELEPHONE ENCOUNTER
"Pt scheduled for colonoscopy with Dr. Barber on 5/28/19 with STAT labs prior to procedure at 1015, procedure time 1130 with 1045 arrival on 4th floor endoscopy. Prep, Peg [phone], e-scribed to preferred pharmacy listed in chart, verified with Pt at time of scheduling. Pt prep instructions mailed to Pt. Per Dr. Solares Pt ok to hold Plavix x 5 days prior to procedure, "continue 81 mg aspirin daily", see telephone encounter dated 5/2/19. Pt verbalized understanding.    "

## 2019-05-08 ENCOUNTER — TELEPHONE (OUTPATIENT)
Dept: TRANSPLANT | Facility: CLINIC | Age: 64
End: 2019-05-08

## 2019-05-08 NOTE — TELEPHONE ENCOUNTER
----- Message from Crista Canada sent at 5/8/2019  9:44 AM CDT -----  Contact: Patient  Needs Advice    Reason for call: Patient called to inform Aide that his colonoscopy is schedule for 5/28 here at [facility].         Communication Preference: [phone]    Additional Information: n/a

## 2019-05-20 DIAGNOSIS — Z12.11 SPECIAL SCREENING FOR MALIGNANT NEOPLASMS, COLON: Primary | ICD-10-CM

## 2019-05-20 DIAGNOSIS — Z99.2 DIALYSIS PATIENT: ICD-10-CM

## 2019-05-20 RX ORDER — SODIUM, POTASSIUM,MAG SULFATES 17.5-3.13G
1 SOLUTION, RECONSTITUTED, ORAL ORAL DAILY
Qty: 1 KIT | Refills: 0 | Status: SHIPPED | OUTPATIENT
Start: 2019-05-20 | End: 2019-05-22

## 2019-05-28 ENCOUNTER — HOSPITAL ENCOUNTER (OUTPATIENT)
Facility: HOSPITAL | Age: 64
Discharge: HOME | End: 2019-05-28
Attending: COLON & RECTAL SURGERY | Admitting: COLON & RECTAL SURGERY
Payer: MEDICARE

## 2019-05-28 ENCOUNTER — ANESTHESIA EVENT (OUTPATIENT)
Dept: ENDOSCOPY | Facility: HOSPITAL | Age: 64
End: 2019-05-28
Payer: MEDICARE

## 2019-05-28 ENCOUNTER — ANESTHESIA (OUTPATIENT)
Dept: ENDOSCOPY | Facility: HOSPITAL | Age: 64
End: 2019-05-28
Payer: COMMERCIAL

## 2019-05-28 VITALS
OXYGEN SATURATION: 96 % | WEIGHT: 193 LBS | SYSTOLIC BLOOD PRESSURE: 131 MMHG | DIASTOLIC BLOOD PRESSURE: 61 MMHG | BODY MASS INDEX: 27.63 KG/M2 | HEIGHT: 70 IN | HEART RATE: 70 BPM | TEMPERATURE: 98 F | RESPIRATION RATE: 18 BRPM

## 2019-05-28 DIAGNOSIS — Z12.11 SCREENING FOR COLON CANCER: Primary | ICD-10-CM

## 2019-05-28 LAB — POCT GLUCOSE: 99 MG/DL (ref 70–110)

## 2019-05-28 PROCEDURE — 27201089 HC SNARE, DISP (ANY): Mod: TXP | Performed by: COLON & RECTAL SURGERY

## 2019-05-28 PROCEDURE — 37000009 HC ANESTHESIA EA ADD 15 MINS: Mod: TXP | Performed by: COLON & RECTAL SURGERY

## 2019-05-28 PROCEDURE — 45385 PR COLONOSCOPY,REMV LESN,SNARE: ICD-10-PCS | Mod: TXP,,, | Performed by: COLON & RECTAL SURGERY

## 2019-05-28 PROCEDURE — 25000003 PHARM REV CODE 250: Mod: TXP | Performed by: NURSE PRACTITIONER

## 2019-05-28 PROCEDURE — 88305 TISSUE SPECIMEN TO PATHOLOGY - SURGERY: ICD-10-PCS | Mod: 26,,, | Performed by: PATHOLOGY

## 2019-05-28 PROCEDURE — E9220 PRA ENDO ANESTHESIA: ICD-10-PCS | Mod: TXP,,, | Performed by: NURSE ANESTHETIST, CERTIFIED REGISTERED

## 2019-05-28 PROCEDURE — 63600175 PHARM REV CODE 636 W HCPCS: Mod: TXP | Performed by: NURSE ANESTHETIST, CERTIFIED REGISTERED

## 2019-05-28 PROCEDURE — E9220 PRA ENDO ANESTHESIA: HCPCS | Mod: TXP,,, | Performed by: NURSE ANESTHETIST, CERTIFIED REGISTERED

## 2019-05-28 PROCEDURE — 45385 COLONOSCOPY W/LESION REMOVAL: CPT | Mod: TXP | Performed by: COLON & RECTAL SURGERY

## 2019-05-28 PROCEDURE — 37000008 HC ANESTHESIA 1ST 15 MINUTES: Mod: TXP | Performed by: COLON & RECTAL SURGERY

## 2019-05-28 PROCEDURE — 45385 COLONOSCOPY W/LESION REMOVAL: CPT | Mod: TXP,,, | Performed by: COLON & RECTAL SURGERY

## 2019-05-28 PROCEDURE — 88305 TISSUE EXAM BY PATHOLOGIST: CPT | Mod: TXP | Performed by: PATHOLOGY

## 2019-05-28 RX ORDER — SODIUM CHLORIDE 0.9 % (FLUSH) 0.9 %
10 SYRINGE (ML) INJECTION
Status: DISCONTINUED | OUTPATIENT
Start: 2019-05-28 | End: 2019-05-28 | Stop reason: HOSPADM

## 2019-05-28 RX ORDER — PROPOFOL 10 MG/ML
VIAL (ML) INTRAVENOUS CONTINUOUS PRN
Status: DISCONTINUED | OUTPATIENT
Start: 2019-05-28 | End: 2019-05-28

## 2019-05-28 RX ORDER — SODIUM CHLORIDE 9 MG/ML
INJECTION, SOLUTION INTRAVENOUS CONTINUOUS
Status: DISCONTINUED | OUTPATIENT
Start: 2019-05-28 | End: 2019-05-28 | Stop reason: HOSPADM

## 2019-05-28 RX ORDER — LIDOCAINE HCL/PF 100 MG/5ML
SYRINGE (ML) INTRAVENOUS
Status: DISCONTINUED | OUTPATIENT
Start: 2019-05-28 | End: 2019-05-28

## 2019-05-28 RX ORDER — PROPOFOL 10 MG/ML
VIAL (ML) INTRAVENOUS
Status: DISCONTINUED | OUTPATIENT
Start: 2019-05-28 | End: 2019-05-28

## 2019-05-28 RX ADMIN — PROPOFOL 70 MG: 10 INJECTION, EMULSION INTRAVENOUS at 10:05

## 2019-05-28 RX ADMIN — LIDOCAINE HYDROCHLORIDE 100 MG: 20 INJECTION, SOLUTION INTRAVENOUS at 10:05

## 2019-05-28 RX ADMIN — PROPOFOL 150 MCG/KG/MIN: 10 INJECTION, EMULSION INTRAVENOUS at 10:05

## 2019-05-28 RX ADMIN — SODIUM CHLORIDE: 0.9 INJECTION, SOLUTION INTRAVENOUS at 10:05

## 2019-05-28 NOTE — PROVATION PATIENT INSTRUCTIONS
Discharge Summary/Instructions after an Endoscopic Procedure  Patient Name: Asif Cortez  Patient MRN: [id number]  Patient YOB: 1955  Tuesday, May 28, 2019  Josep Barber MD  RESTRICTIONS:  During your procedure today, you received medications for sedation.  These   medications may affect your judgment, balance and coordination.  Therefore,   for 24 hours, you have the following restrictions:   - DO NOT drive a car, operate machinery, make legal/financial decisions,   sign important papers or drink alcohol.    ACTIVITY:  Today: no heavy lifting, straining or running due to procedural   sedation/anesthesia.  The following day: return to full activity including work.  DIET:  Eat and drink normally unless instructed otherwise.     TREATMENT FOR COMMON SIDE EFFECTS:  - Mild abdominal pain, nausea, belching, bloating or excessive gas:  rest,   eat lightly and use a heating pad.  - Sore Throat: treat with throat lozenges and/or gargle with warm salt   water.  - Because air was used during the procedure, expelling large amounts of air   from your rectum or belching is normal.  - If a bowel prep was taken, you may not have a bowel movement for 1-3 days.    This is normal.  SYMPTOMS TO WATCH FOR AND REPORT TO YOUR PHYSICIAN:  1. Abdominal pain or bloating, other than gas cramps.  2. Chest pain.  3. Back pain.  4. Signs of infection such as: chills or fever occurring within 24 hours   after the procedure.  5. Rectal bleeding, which would show as bright red, maroon, or black stools.   (A tablespoon of blood from the rectum is not serious, especially if   hemorrhoids are present.)  6. Vomiting.  7. Weakness or dizziness.  GO DIRECTLY TO THE NEAREST EMERGENCY ROOM IF YOU HAVE ANY OF THE FOLLOWING:      Difficulty breathing              Chills and/or fever over 101 F   Persistent vomiting and/or vomiting blood   Severe abdominal pain   Severe chest pain   Black, tarry stools   Bleeding- more than one tablespoon   Any  other symptom or condition that you feel may need urgent attention  Your doctor recommends these additional instructions:  If any biopsies were taken, your doctors clinic will contact you in 1 to 2   weeks with any results.  - Discharge patient to home (ambulatory).   - Patient has a contact number available for emergencies.  The signs and   symptoms of potential delayed complications were discussed with the   patient.  Return to normal activities tomorrow.  Written discharge   instructions were provided to the patient.   - Resume previous diet.   - Continue present medications.   - Resume Plavix (clopidogrel) at prior dose tomorrow.  Refer to managing   physician for further adjustment of therapy.   - Await pathology results.   - Repeat colonoscopy in 5 years for surveillance based on pathology   results.  For questions, problems or results please call your physician - Josep Barber MD at Work:  [phone].  [facility], EMERGENCY ROOM PHONE NUMBER: [phone]  IF A COMPLICATION OR EMERGENCY SITUATION ARISES AND YOU ARE UNABLE TO REACH   YOUR PHYSICIAN - GO DIRECTLY TO THE EMERGENCY ROOM.  Josep Barber MD  5/28/2019 11:09:09 AM  This report has been verified and signed electronically.  [employer]

## 2019-05-28 NOTE — ANESTHESIA POSTPROCEDURE EVALUATION
Anesthesia Post Evaluation    Patient: Asif Cortez    Procedure(s) Performed: Procedure(s) (LRB):  COLONOSCOPY (N/A)    Final Anesthesia Type: general  Patient location during evaluation: GI PACU  Patient participation: Yes- Able to Participate  Level of consciousness: awake and alert and oriented  Post-procedure vital signs: reviewed and stable  Pain management: adequate  Airway patency: patent  PONV status at discharge: No PONV  Anesthetic complications: no      Cardiovascular status: hemodynamically stable  Respiratory status: unassisted, spontaneous ventilation and room air  Hydration status: euvolemic  Follow-up not needed.          Vitals Value Taken Time   /61 5/28/2019 11:40 AM   Temp 36.7 °C (98 °F) 5/28/2019 11:15 AM   Pulse 70 5/28/2019 11:40 AM   Resp 18 5/28/2019 11:40 AM   SpO2 96 % 5/28/2019 11:40 AM         No case tracking events are documented in the log.      Pain/Peyton Score: Peyton Score: 10 (5/28/2019 11:30 AM)

## 2019-05-28 NOTE — H&P
Colonoscopy History and Physical      Procedure : Colonoscopy    Indications:  asymptomatic screening exam    Family Hx of CRC: denies    Last Colonoscopy:  5yrs ago, no polyps    Hx of sedation problems: none  FHX of sedation problems: none    Past Medical History:   Diagnosis Date    Anemia     BPH (benign prostatic hyperplasia)     CHF (congestive heart failure)     Chronic pain 1982    back    Coronary artery disease     Coughing 11/19/2017    Depression     Diabetes mellitus, type 2     Disorder of kidney and ureter     Dyspnea 11/19/2017    Hyperlipidemia     Hypertension     Hypertensive crisis 11/19/2017    PAD (peripheral artery disease) 2018    Peripheral neuropathy     Peripheral neuropathy     Pneumonia 11/19/2017    Pneumothorax     URI (upper respiratory infection)        Past Surgical History:   Procedure Laterality Date    ANGIOPLASTY      with stent to legs    AORTA - BILATERAL FEMORAL ARTERY BYPASS GRAFT  04/2018    Open     BACK SURGERY      no hardware    CARDIAC SURGERY      CABG X 4    CORONARY ARTERY BYPASS GRAFT  2011    CREATION OF FEMORAL-FEMORAL ARTERY BYPASS WITH GRAFT Left 04/2018    Open     lumbar laminectomy  1982    TONSILLECTOMY         Review of patient's allergies indicates:  No Known Allergies    No current facility-administered medications on file prior to encounter.      Current Outpatient Medications on File Prior to Encounter   Medication Sig Dispense Refill    amiodarone (PACERONE) 200 MG Tab Take 200 mg by mouth 2 (two) times daily.      amLODIPine (NORVASC) 5 MG tablet Take 5 mg by mouth once daily.      aspirin 325 MG tablet Take 325 mg by mouth once daily.      atorvastatin (LIPITOR) 80 MG tablet Take 80 mg by mouth every evening.      clopidogrel (PLAVIX) 75 mg tablet Take 75 mg by mouth once daily.      hydrocodone-acetaminophen 10-325mg (NORCO)  mg Tab Take 1 tablet by mouth every 8 (eight) hours as needed for Pain.       insulin aspart U-100 (NOVOLOG FLEXPEN U-100 INSULIN) 100 unit/mL InPn pen Inject into the skin 3 (three) times daily with meals. 5 units with breakfast and dinner, 10 units at lunch      insulin detemir (LEVEMIR FLEXTOUCH U-100 INSULN SUBQ) Inject 35 Units into the skin every evening.      multivit-minerals/FA/lycopene (MEN'S DAILY ORAL) Take 1 tablet by mouth once daily.      sevelamer carbonate (RENVELA) 800 mg Tab Take 800 mg by mouth 3 (three) times daily with meals.      tamsulosin (FLOMAX) 0.4 mg Cp24 Take 0.4 mg by mouth every evening.      cloNIDine (CATAPRES) 0.3 MG tablet Take 1 tablet (0.3 mg total) by mouth 3 (three) times daily. 90 tablet 11       Family History   Problem Relation Age of Onset    Heart disease Mother     Diabetes Father     Heart disease Father     Heart attack Father     Diabetes Brother        Social History     Socioeconomic History    Marital status:      Spouse name: Not on file    Number of children: Not on file    Years of education: Not on file    Highest education level: Not on file   Occupational History    Occupation: [employer]    Social Needs    Financial resource strain: Not on file    Food insecurity:     Worry: Not on file     Inability: Not on file    Transportation needs:     Medical: Not on file     Non-medical: Not on file   Tobacco Use    Smoking status: Former Smoker     Packs/day: 0.00     Years: 40.00     Pack years: 0.00     Types: Cigarettes     Last attempt to quit: 2017     Years since quittin.5    Smokeless tobacco: Never Used   Substance and Sexual Activity    Alcohol use: Not Currently     Alcohol/week: 0.6 oz     Types: 1 Cans of beer per week     Comment: rarely    Drug use: No    Sexual activity: Yes     Partners: Female     Birth control/protection: None   Lifestyle    Physical activity:     Days per week: Not on file     Minutes per session: Not on file    Stress: Not on file   Relationships    Social connections:      Talks on phone: Not on file     Gets together: Not on file     Attends Tenriism service: Not on file     Active member of club or organization: Not on file     Attends meetings of clubs or organizations: Not on file     Relationship status: Not on file   Other Topics Concern    Not on file   Social History Narrative    Not on file       Review of Systems -   Respiratory ROS: negative  Cardiovascular ROS: negative  Gastrointestinal ROS: negative  Musculoskeletal ROS: negative  Neurological ROS: negative    Physical Exam:  General: no distress  Head: normocephalic  Oropharynx clear, Mallampati   Lungs:  normal respiratory effort  Heart: regular rate  Abdomen: soft,  Non-tender  Extremities: warm and well perfused  Neuro awake and alert    ASA: III    Patient cleared for Anesthesia:  MAC    Anesthesia/Surgery risks, benefits, and alternative options discussed and understood by patient/family.

## 2019-05-28 NOTE — ANESTHESIA PREPROCEDURE EVALUATION
05/28/2019  Asif Cortez is a 63 y.o., male presenting for colonoscopy as part of pre renal transplant work up.  Hx of CAD s/p CABG in 2011.  Recent LHC with patent left sided grafts and no interventions.  Pt denies any recent CP or SOB.  Most recent LVEF 35-40%.  Reports being able to perform >4 METS.  S/P aorto bifemoral bypass with no resultant claudication.  Dialyzed yesterday.  Last Plavix dose 5 days ago per patient.    Past Medical History:   Diagnosis Date    Anemia     BPH (benign prostatic hyperplasia)     CHF (congestive heart failure)     Chronic pain 1982    back    Coronary artery disease     Coughing 11/19/2017    Depression     Diabetes mellitus, type 2     Disorder of kidney and ureter     Dyspnea 11/19/2017    Hyperlipidemia     Hypertension     Hypertensive crisis 11/19/2017    PAD (peripheral artery disease) 2018    Peripheral neuropathy     Peripheral neuropathy     Pneumonia 11/19/2017    Pneumothorax     URI (upper respiratory infection)      Past Surgical History:   Procedure Laterality Date    ANGIOPLASTY      with stent to legs    AORTA - BILATERAL FEMORAL ARTERY BYPASS GRAFT  04/2018    Open     BACK SURGERY      no hardware    CARDIAC SURGERY      CABG X 4    CORONARY ARTERY BYPASS GRAFT  2011    CREATION OF FEMORAL-FEMORAL ARTERY BYPASS WITH GRAFT Left 04/2018    Open     lumbar laminectomy  1982    TONSILLECTOMY       Review of patient's allergies indicates:  No Known Allergies  No current facility-administered medications on file prior to encounter.      Current Outpatient Medications on File Prior to Encounter   Medication Sig Dispense Refill    amiodarone (PACERONE) 200 MG Tab Take 200 mg by mouth 2 (two) times daily.      amLODIPine (NORVASC) 5 MG tablet Take 5 mg by mouth once daily.      aspirin 325 MG tablet Take 325 mg by mouth once daily.       atorvastatin (LIPITOR) 80 MG tablet Take 80 mg by mouth every evening.      clopidogrel (PLAVIX) 75 mg tablet Take 75 mg by mouth once daily.      hydrocodone-acetaminophen 10-325mg (NORCO)  mg Tab Take 1 tablet by mouth every 8 (eight) hours as needed for Pain.      insulin aspart U-100 (NOVOLOG FLEXPEN U-100 INSULIN) 100 unit/mL InPn pen Inject into the skin 3 (three) times daily with meals. 5 units with breakfast and dinner, 10 units at lunch      insulin detemir (LEVEMIR FLEXTOUCH U-100 INSULN SUBQ) Inject 35 Units into the skin every evening.      multivit-minerals/FA/lycopene (MEN'S DAILY ORAL) Take 1 tablet by mouth once daily.      sevelamer carbonate (RENVELA) 800 mg Tab Take 800 mg by mouth 3 (three) times daily with meals.      tamsulosin (FLOMAX) 0.4 mg Cp24 Take 0.4 mg by mouth every evening.      cloNIDine (CATAPRES) 0.3 MG tablet Take 1 tablet (0.3 mg total) by mouth 3 (three) times daily. 90 tablet 11     Lab Results   Component Value Date    WBC 11.35 02/21/2019    HGB 12.0 (L) 02/21/2019    HCT 36.6 (L) 02/21/2019    MCV 95 02/21/2019     02/21/2019     BMP  Lab Results   Component Value Date     02/21/2019    K 3.9 05/28/2019     02/21/2019    CO2 25 02/21/2019    BUN 60 (H) 02/21/2019    CREATININE 4.2 (H) 02/21/2019    CALCIUM 9.4 02/21/2019    ANIONGAP 15 02/21/2019    ESTGFRAFRICA 16.4 (A) 02/21/2019    EGFRNONAA 14.2 (A) 02/21/2019         Anesthesia Evaluation    I have reviewed the Patient Summary Reports.     I have reviewed the Medications.     Review of Systems  Anesthesia Hx:  No problems with previous Anesthesia   Denies Personal Hx of Anesthesia complications.   Cardiovascular:   Exercise tolerance: good Hypertension CAD  CABG/stent  CHF PVD    Pulmonary:   Denies COPD.  Denies Asthma.    Renal/:   Chronic Renal Disease, ESRD, Dialysis    Hepatic/GI:  Hepatic/GI Normal    Neurological:   Denies CVA. Denies Seizures.        Physical  Exam  General:  Well nourished    Airway/Jaw/Neck:  Airway Findings: Mouth Opening: Normal Tongue: Normal  General Airway Assessment: Adult  Mallampati: II  TM Distance: Normal, at least 6 cm  Jaw/Neck Findings:  Neck ROM: Normal ROM      Dental:  Dental Findings: Upper partial dentures        Mental Status:  Mental Status Findings:  Cooperative, Alert and Oriented         Anesthesia Plan  Type of Anesthesia, risks & benefits discussed:  Anesthesia Type:  general  Patient's Preference:   Intra-op Monitoring Plan: standard ASA monitors  Intra-op Monitoring Plan Comments:   Post Op Pain Control Plan: per primary service following discharge from PACU  Post Op Pain Control Plan Comments:   Induction:   IV  Beta Blocker:  Patient is not currently on a Beta-Blocker (No further documentation required).       Informed Consent: Patient understands risks and agrees with Anesthesia plan.  Questions answered. Anesthesia consent signed with patient.  ASA Score: 3     Day of Surgery Review of History & Physical:    H&P update referred to the surgeon.         Ready For Surgery From Anesthesia Perspective.

## 2019-05-28 NOTE — PLAN OF CARE
POC reiviewed with pt. Pt verbalized understanding of discharge instructions including diet, s/s to notify md,  and follow up. pt refused wheelchair and will ambulate with family

## 2019-05-28 NOTE — TRANSFER OF CARE
"Anesthesia Transfer of Care Note    Patient: Asif Cortez    Procedure(s) Performed: Procedure(s) (LRB):  COLONOSCOPY (N/A)    Patient location: GI    Anesthesia Type: general    Transport from OR: Transported from OR on room air with adequate spontaneous ventilation    Post pain: adequate analgesia    Post assessment: no apparent anesthetic complications and tolerated procedure well    Post vital signs: stable    Level of consciousness: responds to stimulation    Nausea/Vomiting: no vomiting    Complications: none    Transfer of care protocol was followed      Last vitals:   Visit Vitals  BP (!) 107/50 (BP Location: Left arm, Patient Position: Lying)   Pulse 86   Temp 36.7 °C (98 °F)   Resp 17   Ht 5' 10" (1.778 m)   Wt 87.5 kg (193 lb)   SpO2 (!) 93%   BMI 27.69 kg/m²     "

## 2019-05-28 NOTE — PLAN OF CARE
POC reviewed with pt. ID applied and verified. Plan of care initiated with patient. Understanding verbalized. No further questions or concerns

## 2019-05-29 NOTE — ANESTHESIA POSTPROCEDURE EVALUATION
Anesthesia Post Evaluation    Patient: Asif Cortez    Procedure(s) Performed: Procedure(s) (LRB):  COLONOSCOPY (N/A)    Final Anesthesia Type: general  Patient location during evaluation: GI PACU  Patient participation: Yes- Able to Participate  Level of consciousness: awake and alert  Post-procedure vital signs: reviewed and stable  Pain management: adequate  Airway patency: patent  PONV status at discharge: No PONV  Anesthetic complications: no      Cardiovascular status: hemodynamically stable  Respiratory status: unassisted and spontaneous ventilation  Hydration status: euvolemic  Follow-up not needed.          Vitals Value Taken Time   /61 5/28/2019 11:40 AM   Temp 36.7 °C (98 °F) 5/28/2019 11:15 AM   Pulse 70 5/28/2019 11:40 AM   Resp 18 5/28/2019 11:40 AM   SpO2 96 % 5/28/2019 11:40 AM         No case tracking events are documented in the log.      Pain/Peyton Score: Peyton Score: 10 (5/28/2019 11:30 AM)

## 2019-06-04 ENCOUNTER — TELEPHONE (OUTPATIENT)
Dept: ENDOSCOPY | Facility: HOSPITAL | Age: 64
End: 2019-06-04

## 2019-06-04 ENCOUNTER — TELEPHONE (OUTPATIENT)
Dept: TRANSPLANT | Facility: CLINIC | Age: 64
End: 2019-06-04

## 2019-06-04 NOTE — TELEPHONE ENCOUNTER
----- Message from Crista Canada sent at 6/4/2019  1:09 PM CDT -----  Contact: Patient  Needs Advice    Reason for call: Patient called to f/u on coloscopy he had done last week.         Communication Preference: [phone]    Additional Information: n/a

## 2019-06-12 ENCOUNTER — TELEPHONE (OUTPATIENT)
Dept: TRANSPLANT | Facility: CLINIC | Age: 64
End: 2019-06-12

## 2019-06-23 NOTE — PROGRESS NOTES
SPECIMEN  1) Ascending colon polyp.  2) Rectal polyp.  FINAL PATHOLOGIC DIAGNOSIS  1. Ascending colon polyp , biopsy:  Vegetable matter only. No polyp identified.  2. Rectal polyp, biopsy:  Hyperplastic polyp.  Negative for dysplasia or malignancy.  Diagnosed by: Cordell Lizarraga  (Electronically Signed: 2019-05-31 15:58:32)    Biopsy showed non-neoplastic tissue which is NOT pathologic and does NOT require surveillance or further testing.      I would recommend repeat colonoscopy in 10 years    If you have any questions or if I can clarify any of the above please contact me:      Mobile [phone]   Pager [phone]  email [email or web address]  EPIC message  Nurse Viji Henderson [phone]   Elsa Obando:  [phone]    Sincerely  HJosep MD, FACS, FASCRS  Staff Surgeon  Dept of Colon and Rectal Surgery

## 2019-06-28 ENCOUNTER — COMMITTEE REVIEW (OUTPATIENT)
Dept: TRANSPLANT | Facility: CLINIC | Age: 64
End: 2019-06-28

## 2019-06-28 NOTE — COMMITTEE REVIEW
Native Organ Dx: Diabetes Mellitus - Type II      Not approved for LRD/CAD transplant due to CAD and complexity of transplant surgery. Prohibitive indefinitely from surgical perspective.    I spoke with Mr Cortez about committee decision and he wanted to know what his next step was. I advised that there is no next step at [facility] however he should speak with his nephrologist to see if it is feasible to try a different center. He states he was also turned down by [facility] so I advised he would need to check out of state programs if he wished. He verbalized understanding.    Note written by Aide Sandy RN    ===============================================    I was present at the meeting and attest to the decision of the committee.

## 2019-06-28 NOTE — LETTER
June 28, 2019    Asif Cortez  [address] [place] [address]    Dear Asif Cortez:  MRN: [id number]    It is the duty of the [facility] Selection [facility] to determine which patients are candidates for a transplant. For this reason, our committee has the difficult task of evaluating patients to determine which ones have the greatest chance of having a successful transplant. We are aware of the magnitude of this responsibility, and we approach it with reverence and humility.    It is with regret I inform you that you are not approved as a transplant candidate due to extensive blood vessel disease in the heart as well as surgical complexity related to aortobifemoral and femoropopliteal by pass grafting.  Based on this review you are not a candidate for a transplant at [facility].      The selection committee carefully considers each patient's transplant candidacy and determines whether it is safe to proceed with transplantation on a case-by-case basis using established selection criteria.  At present, the risk of proceeding with an elective transplant surgery has become too high.                                                                               Although the selection committee believes you are not a suitable transplant candidate, you have the option to be evaluated at other transplant centers who may have different selection criteria.  You may request your [facility] records be sent to any center of your choice by contacting our Medical Records Department at [phone].                                                                               Attached is a letter from the [employer] Organ Sharing (UNOS).  It describes the services and information offered to patients by UNOS and the Organ Procurement and Transplant Network.    The [facility] Committee sincerely wishes you the best and remains available to answer any questions.  Please do not hesitate to contact our  pre-transplant office if we can assist you in any other way.                                                                               Sincerely,      Ann Marti MD  Medical Director, Kidney & Kidney/Pancreas Transplantation  [facility]  In basket:  Dr. Asif Meza  Faxed to:   [facility] [place]    Encl: UNOS Letter          OPTN/UNOS: Your Resource for Organ Transplant Information        If you have a question regarding your own medical care, you always should call your transplant center first. However, for general organ transplant-related information, you can call the [employer] Organ [employer] (UNOS) toll-free patient services line at 1-493.468.1940.    Anyone, including potential transplant candidates, recipients, family members/friends, living donors, and/or donor family members can call this number to:    · talk about organ donation, living donation, how transplant and donation work, the donation process, transplant policies, and transplant/donor information;  · get a free patient information kit with helpful booklets, waiting list and transplant information, and a list of all transplant centers;  · ask questions about the Organ Procurement and Transplantation Network (OPTN) web site (www.optn.transplant.hrsa.gov); the UNOS Web site (www.unos.org); or the UNOS web site for living donors and transplant recipients (www.transplantliving.org);  · learn how UNOS and the OPTN can help you;  · talk about any concerns that you may have with a transplant center and how they perform    UNOS is a not-for-profit organization that provides all of the administrative services for the national OPTN under federal contract to the Health Resources and Services Administration (HRSA), an agency under the U.S. Department of Health and Human Services (HHS).     UNOS and OPTN responsibilities include:    · writing educational material for patients, the public and professionals;  · helping to make people  aware of the need for donated organs and tissue;  · writing organ transplant policy with help from doctors, nurses, transplant patients/candidates, donor families, living donors, and the public;  · coordinating the organ matching and placement process;  · collecting information about every organ transplant and donation that occurs in the [place].    Remember, you should contact your transplant center directly if you have questions or concerns about your own medical care including medical records, work-up progress and test reports. [facility] is not your transplant center, and staff at [facility] will not be able to transfer you to your transplant center, so keep your transplant centers phone number handy. But, while you research your transplant needs and learn as much as you can about transplantation and donation, we welcome your call to our toll-free patient services line at 1-346.938.8979.